# Patient Record
Sex: FEMALE | Race: WHITE | Employment: OTHER | URBAN - METROPOLITAN AREA
[De-identification: names, ages, dates, MRNs, and addresses within clinical notes are randomized per-mention and may not be internally consistent; named-entity substitution may affect disease eponyms.]

---

## 2020-12-09 ENCOUNTER — LAB ENCOUNTER (OUTPATIENT)
Dept: LAB | Age: 60
End: 2020-12-09
Attending: PHYSICIAN ASSISTANT
Payer: OTHER GOVERNMENT

## 2020-12-09 ENCOUNTER — HOSPITAL ENCOUNTER (OUTPATIENT)
Dept: GENERAL RADIOLOGY | Age: 60
Discharge: HOME | End: 2020-12-09
Attending: PHYSICIAN ASSISTANT
Payer: OTHER GOVERNMENT

## 2020-12-09 DIAGNOSIS — R50.9 FEVER, UNSPECIFIED FEVER CAUSE: ICD-10-CM

## 2020-12-09 DIAGNOSIS — R05.9 COUGH: ICD-10-CM

## 2020-12-09 PROCEDURE — 36415 COLL VENOUS BLD VENIPUNCTURE: CPT

## 2020-12-09 PROCEDURE — 85025 COMPLETE CBC W/AUTO DIFF WBC: CPT

## 2020-12-09 PROCEDURE — 71046 X-RAY EXAM CHEST 2 VIEWS: CPT | Performed by: PHYSICIAN ASSISTANT

## 2020-12-11 ENCOUNTER — HOSPITAL ENCOUNTER (INPATIENT)
Facility: HOSPITAL | Age: 60
LOS: 5 days | Discharge: HOME | DRG: 177 | End: 2020-12-17
Attending: EMERGENCY MEDICINE | Admitting: HOSPITALIST
Payer: OTHER GOVERNMENT

## 2020-12-11 DIAGNOSIS — R09.02 HYPOXIA: ICD-10-CM

## 2020-12-11 DIAGNOSIS — U07.1 PNEUMONIA DUE TO COVID-19 VIRUS: Primary | ICD-10-CM

## 2020-12-11 DIAGNOSIS — J12.82 PNEUMONIA DUE TO COVID-19 VIRUS: Primary | ICD-10-CM

## 2020-12-11 RX ORDER — DEXAMETHASONE SODIUM PHOSPHATE 4 MG/ML
6 VIAL (ML) INJECTION ONCE
Status: COMPLETED | OUTPATIENT
Start: 2020-12-11 | End: 2020-12-12

## 2020-12-12 ENCOUNTER — APPOINTMENT (OUTPATIENT)
Dept: ULTRASOUND IMAGING | Facility: HOSPITAL | Age: 60
DRG: 177 | End: 2020-12-12
Attending: STUDENT IN AN ORGANIZED HEALTH CARE EDUCATION/TRAINING PROGRAM
Payer: OTHER GOVERNMENT

## 2020-12-12 PROBLEM — R09.02 HYPOXIA: Status: ACTIVE | Noted: 2020-12-12

## 2020-12-12 PROCEDURE — XW033E5 INTRODUCTION OF REMDESIVIR ANTI-INFECTIVE INTO PERIPHERAL VEIN, PERCUTANEOUS APPROACH, NEW TECHNOLOGY GROUP 5: ICD-10-PCS | Performed by: HOSPITALIST

## 2020-12-12 PROCEDURE — 3E0333Z INTRODUCTION OF ANTI-INFLAMMATORY INTO PERIPHERAL VEIN, PERCUTANEOUS APPROACH: ICD-10-PCS | Performed by: HOSPITALIST

## 2020-12-12 PROCEDURE — 93971 EXTREMITY STUDY: CPT | Performed by: STUDENT IN AN ORGANIZED HEALTH CARE EDUCATION/TRAINING PROGRAM

## 2020-12-12 PROCEDURE — 99223 1ST HOSP IP/OBS HIGH 75: CPT | Performed by: HOSPITALIST

## 2020-12-12 RX ORDER — PREDNISONE 1 MG/1
5 TABLET ORAL DAILY
Status: ON HOLD | COMMUNITY
End: 2020-12-16

## 2020-12-12 RX ORDER — SODIUM CHLORIDE 9 MG/ML
INJECTION, SOLUTION INTRAVENOUS CONTINUOUS
Status: ACTIVE | OUTPATIENT
Start: 2020-12-12 | End: 2020-12-12

## 2020-12-12 RX ORDER — LORAZEPAM 0.5 MG/1
0.25 TABLET ORAL ONCE
Status: DISCONTINUED | OUTPATIENT
Start: 2020-12-12 | End: 2020-12-17

## 2020-12-12 RX ORDER — ENOXAPARIN SODIUM 100 MG/ML
40 INJECTION SUBCUTANEOUS DAILY
Status: DISCONTINUED | OUTPATIENT
Start: 2020-12-12 | End: 2020-12-13

## 2020-12-12 RX ORDER — FUROSEMIDE 10 MG/ML
40 INJECTION INTRAMUSCULAR; INTRAVENOUS ONCE
Status: COMPLETED | OUTPATIENT
Start: 2020-12-12 | End: 2020-12-12

## 2020-12-12 RX ORDER — LEVOTHYROXINE SODIUM 0.07 MG/1
75 TABLET ORAL
Status: DISCONTINUED | OUTPATIENT
Start: 2020-12-12 | End: 2020-12-17

## 2020-12-12 RX ORDER — ALBUTEROL SULFATE 90 UG/1
4 AEROSOL, METERED RESPIRATORY (INHALATION) EVERY 4 HOURS PRN
Status: DISCONTINUED | OUTPATIENT
Start: 2020-12-12 | End: 2020-12-17

## 2020-12-12 RX ORDER — DEXAMETHASONE SODIUM PHOSPHATE 4 MG/ML
6 VIAL (ML) INJECTION DAILY
Status: DISCONTINUED | OUTPATIENT
Start: 2020-12-12 | End: 2020-12-12

## 2020-12-12 RX ORDER — ALBUTEROL SULFATE 90 UG/1
4 AEROSOL, METERED RESPIRATORY (INHALATION) 4 TIMES DAILY
Status: DISCONTINUED | OUTPATIENT
Start: 2020-12-12 | End: 2020-12-12

## 2020-12-12 RX ORDER — DEXAMETHASONE SODIUM PHOSPHATE 4 MG/ML
6 VIAL (ML) INJECTION NIGHTLY
Status: DISCONTINUED | OUTPATIENT
Start: 2020-12-12 | End: 2020-12-17

## 2020-12-12 RX ORDER — ONDANSETRON 2 MG/ML
4 INJECTION INTRAMUSCULAR; INTRAVENOUS EVERY 6 HOURS PRN
Status: DISCONTINUED | OUTPATIENT
Start: 2020-12-12 | End: 2020-12-17

## 2020-12-12 RX ORDER — AZITHROMYCIN 250 MG/1
250 TABLET, FILM COATED ORAL DAILY
Status: ON HOLD | COMMUNITY
End: 2020-12-16

## 2020-12-12 RX ORDER — LEVOTHYROXINE SODIUM 0.07 MG/1
75 TABLET ORAL
COMMUNITY

## 2020-12-12 RX ORDER — ALBUTEROL SULFATE 90 UG/1
4 AEROSOL, METERED RESPIRATORY (INHALATION)
Status: DISCONTINUED | OUTPATIENT
Start: 2020-12-12 | End: 2020-12-12

## 2020-12-12 RX ORDER — ACETAMINOPHEN 325 MG/1
650 TABLET ORAL EVERY 6 HOURS PRN
Status: DISCONTINUED | OUTPATIENT
Start: 2020-12-12 | End: 2020-12-17

## 2020-12-12 NOTE — ED PROVIDER NOTES
Patient Seen in: [facility] Emergency Department      History   Patient presents with:  Difficulty Breathing  Covid    Stated Complaint: covid+ MARY JO    HPI    44-year-old female presents to the emergency room from the immediate care for evaluation of masses. Trachea midline. No cervical lymphadenopathy. HEART: Regular rate and rhythm, no murmurs. LUNGS: Coarse breath sounds bilaterally, no wheezing, no rales, no stridor  ABDOMEN: Soft, nondistended,non tender, no rebound, no rigidity, no guarding. n order CBC WITH DIFFERENTIAL WITH PLATELET.   Procedure                               Abnormality         Status                     ---------                               -----------         ------                     CBC W/ DIFFERENTIAL[[id number]] virus  (primary encounter diagnosis)  Hypoxia    Disposition:  Admit  12/12/2020 12:24 am    Follow-up:  No follow-up provider specified.         Medications Prescribed:  Current Discharge Medication List                          Present on Admission  Date

## 2020-12-12 NOTE — H&P
[facility]  History and Physical     [facility] Patient Status:  Emergency    1960 MRN [id number]   Location [address] [facility] Attending Asif Vidales, [address] [facility] Day # 0 PCP Harika Chen MD     Chief Complaint: dysp Anicteric. Neck: No lymphadenopathy. No JVD. No carotid bruits. Respiratory: Decreased air entry, no wheezes, mild rhonchi  Cardiovascular: S1, S2. Regular rate and rhythm. No murmurs, no rubs or gallops. Equal pulses.    Chest and Back: No tenderness or

## 2020-12-12 NOTE — COVID NURSING ASSESSMENT
COVID-19 Daily Discharge Readiness-Nursing    O2 Sat at Rest:    93 %  On 6L nasal cannula  O2 Sat with Exertion:   87 % on    6 liters  With ambulation  Temperature max from last 24 hrs: Temp (24hrs), Av.1 °F (37.3 °C), Min:98.4 °F (36.9 °C), Max:100

## 2020-12-12 NOTE — ED INITIAL ASSESSMENT (HPI)
62 yo F, covid+, symptoms since 12/1/20, sent from [facility] for hypoxia. 60% with ambulatory O2. 80% room air on arrival while at rest. Pt is awake, but slow to respond.

## 2020-12-13 ENCOUNTER — APPOINTMENT (OUTPATIENT)
Dept: CT IMAGING | Facility: HOSPITAL | Age: 60
DRG: 177 | End: 2020-12-13
Attending: HOSPITALIST
Payer: OTHER GOVERNMENT

## 2020-12-13 ENCOUNTER — APPOINTMENT (OUTPATIENT)
Dept: GENERAL RADIOLOGY | Facility: HOSPITAL | Age: 60
DRG: 177 | End: 2020-12-13
Attending: HOSPITALIST
Payer: OTHER GOVERNMENT

## 2020-12-13 PROCEDURE — XW043H5 INTRODUCTION OF TOCILIZUMAB INTO CENTRAL VEIN, PERCUTANEOUS APPROACH, NEW TECHNOLOGY GROUP 5: ICD-10-PCS | Performed by: HOSPITALIST

## 2020-12-13 PROCEDURE — 71275 CT ANGIOGRAPHY CHEST: CPT | Performed by: HOSPITALIST

## 2020-12-13 PROCEDURE — 99233 SBSQ HOSP IP/OBS HIGH 50: CPT | Performed by: STUDENT IN AN ORGANIZED HEALTH CARE EDUCATION/TRAINING PROGRAM

## 2020-12-13 PROCEDURE — 71045 X-RAY EXAM CHEST 1 VIEW: CPT | Performed by: HOSPITALIST

## 2020-12-13 RX ORDER — LIDOCAINE AND PRILOCAINE 25; 25 MG/G; MG/G
CREAM TOPICAL AS NEEDED
Status: DISCONTINUED | OUTPATIENT
Start: 2020-12-13 | End: 2020-12-17

## 2020-12-13 RX ORDER — GARLIC EXTRACT 500 MG
1 CAPSULE ORAL
Status: DISCONTINUED | OUTPATIENT
Start: 2020-12-13 | End: 2020-12-14

## 2020-12-13 RX ORDER — FUROSEMIDE 10 MG/ML
20 INJECTION INTRAMUSCULAR; INTRAVENOUS ONCE
Status: COMPLETED | OUTPATIENT
Start: 2020-12-13 | End: 2020-12-13

## 2020-12-13 RX ORDER — ENOXAPARIN SODIUM 100 MG/ML
0.6 INJECTION SUBCUTANEOUS EVERY 12 HOURS SCHEDULED
Status: DISCONTINUED | OUTPATIENT
Start: 2020-12-13 | End: 2020-12-16

## 2020-12-13 NOTE — COVID NURSING ASSESSMENT
COVID-19 Daily Discharge Readiness-Nursing    O2 Sat at Rest:   92  %  On 10 liters  O2 Sat with Exertion:  82  % on   15 liters   Temperature max from last 24 hrs: Temp (24hrs), Av.6 °F (36.4 °C), Min:94.6 °F (34.8 °C), Max:98.4 °F (36.9 °C)    Inflam

## 2020-12-13 NOTE — PLAN OF CARE
Problem: Patient/Family Goals  Goal: Patient/Family Long Term Goal  Description: Patient's Long Term Goal: To recover from covid.     Interventions:  - Decadron, remdesivir  -prone, wean O2  -IS, albuterol  - See additional Care Plan goals for specific in

## 2020-12-13 NOTE — RESPIRATORY THERAPY NOTE
Attempted to draw ABG on patient because of increased O2 demand overnight. Patient refusing, talked extensively with patient about the reasoning behind the blood draw.  Patient acknowledges the information and agrees if O2 demands continue to go up overnigh

## 2020-12-13 NOTE — PROGRESS NOTES
[facility]  Progress Note     Ranjan Ferrera Patient Status:  Inpatient    1960 MRN [id number]   [facility] 4NW-A Attending Inocente Homans, MD   Hosp Day # 1 PCP Regan Lombardi MD     Chief Complaint: COVID     S: Patient  With results for input(s): PTP, INR in the last 168 hours. Recent Labs   Lab 12/11/20  2258 12/12/20  0109 12/12/20  0833   TROP <0.045 <0.045 <0.045   *  --  430*            Imaging: Imaging data reviewed in Epic.     Medications:   • enoxaparin  0.6 m

## 2020-12-13 NOTE — COVID NURSING ASSESSMENT
COVID-19 Daily Discharge Readiness-Nursing    O2 Sat at Rest: 91-94    % on 10L highflow  O2 Sat with Exertion:   89% on  10  liters   Temperature max from last 24 hrs: Temp (24hrs), Av °F (36.7 °C), Min:97.3 °F (36.3 °C), Max:98.4 °F (36.9 °C)    Infl

## 2020-12-13 NOTE — PLAN OF CARE
Pt anxious and complaining unable to sleep, stating she just wants to go home. Order obtained for one time dose ativan. Pt refused medication.  Will continue to monitor

## 2020-12-13 NOTE — PROGRESS NOTES
Pt with rapid increase in O2 requirement. ABG, CXR, lasix, actemra ordered. Will get AM labs now    Vivian Lewis MD    Addendum:  D dimer elevated. Inc lovenox. Check CTA.     Vivian Lewis MD

## 2020-12-13 NOTE — COVID NURSING ASSESSMENT
COVID-19 Daily Discharge Readiness-Nursing    O2 Sat at Rest:   91  %  At 4L of O2  O2 Sat with Exertion:  84  % on    5 liters   Temperature max from last 24 hrs: Temp (24hrs), Av.7 °F (37.1 °C), Min:98 °F (36.7 °C), Max:100 °F (37.8 °C)    Inflammato

## 2020-12-14 PROCEDURE — 99233 SBSQ HOSP IP/OBS HIGH 50: CPT | Performed by: STUDENT IN AN ORGANIZED HEALTH CARE EDUCATION/TRAINING PROGRAM

## 2020-12-14 RX ORDER — GARLIC EXTRACT 500 MG
1 CAPSULE ORAL DAILY
Status: DISCONTINUED | OUTPATIENT
Start: 2020-12-15 | End: 2020-12-14

## 2020-12-14 RX ORDER — GARLIC EXTRACT 500 MG
1 CAPSULE ORAL DAILY
Status: DISCONTINUED | OUTPATIENT
Start: 2020-12-14 | End: 2020-12-17

## 2020-12-14 RX ORDER — DOCUSATE SODIUM 100 MG/1
100 CAPSULE, LIQUID FILLED ORAL 2 TIMES DAILY PRN
Status: DISCONTINUED | OUTPATIENT
Start: 2020-12-14 | End: 2020-12-17

## 2020-12-14 NOTE — BH PROGRESS NOTE
Called the pt to complete a level of care assessment for possible depression. She said, this liaison was the 4th call of the day and she does not want to go over the assessment today. She agreed to have this liaison call her tomorrow.

## 2020-12-14 NOTE — PROGRESS NOTES
[facility]  Progress Note     Ranjan Ferrera Patient Status:  Inpatient    1960 MRN [id number]   [facility] 4NW-A Attending Inocente Homans, MD   Hosp Day # 2 PCP Regan Lombardi MD     Chief Complaint: COVID     S: Patient on 8 TROP <0.045 <0.045 <0.045   *  --  430*            Imaging: Imaging data reviewed in Epic.     Medications:   • Acidophilus/Pectin  1 capsule Oral Daily   • enoxaparin  0.6 mg/kg Subcutaneous Q12H [address]   • dexamethasone Sodium Phosphate  6 mg Shahla Jerry

## 2020-12-14 NOTE — CM/SW NOTE
Pt admitted due to Laurie. SW spoke w/pt to assess for needs/explain SW role. Pt stated she did not feel well at this time. Asked if she would like SW to contact her at a later time. Pt stated she was ok w/talking to SW at this time.  Pt confirmed she did no

## 2020-12-14 NOTE — COVID NURSING ASSESSMENT
COVID-19 Daily Discharge Readiness-Nursing    O2 Sat at Rest:     94 % on 9L  O2 Sat with Exertion:   15  liters   Temperature max from last 24 hrs: Temp (24hrs), Av.1 °F (36.2 °C), Min:94.6 °F (34.8 °C), Max:98.2 °F (36.8 °C)    Inflammatory Markers:

## 2020-12-15 PROCEDURE — 99233 SBSQ HOSP IP/OBS HIGH 50: CPT | Performed by: STUDENT IN AN ORGANIZED HEALTH CARE EDUCATION/TRAINING PROGRAM

## 2020-12-15 RX ORDER — ECHINACEA PURPUREA EXTRACT 125 MG
2 TABLET ORAL AS NEEDED
Status: DISCONTINUED | OUTPATIENT
Start: 2020-12-15 | End: 2020-12-17

## 2020-12-15 NOTE — CONSULTS
[address] [facility]  TEL: [phone]  FAX: [phone]    Severa Floor Thurm Patient Status:  Inpatient    1960 MRN [id number]   [facility] 4NW-A Attending Gregory Gracia MD   [facility] Day # 3 PCP Evelyne Dye , Topical, PRN  •  Saline Nasal Spray (SALINE MIST) 2 spray, 2 spray, Each Nare, PRN  •  Acidophilus/Pectin (PROBIOTIC) CAPS 1 capsule, 1 capsule, Oral, Daily  •  docusate sodium (COLACE) cap 100 mg, 100 mg, Oral, BID PRN  •  lidocaine-prilocaine (EMLA) cr Reviewed in EMR    Imaging:    Imaging results reviewed     Impression:  Patient Active Problem List:     Displacement of intervertebral disc, site unspecified, without myelopathy     Displacement of lumbar intervertebral disc without myelopathy     Pn

## 2020-12-15 NOTE — CONSULTS
Kolby Quijano [address] [facility]  Pulmonary/Critical Care Consult Note  [facility]  Report of Consultation    Laura Cedillo Patient Status:  Inpatient    1960 MRN [id number]   [facility] 4NW-A Attending Jovita Dominguez docusate sodium, lidocaine-prilocaine, ondansetron HCl, sodium chloride 0.9%, acetaminophen, Albuterol Sulfate HFA    Review of Systems:   Constitutional: Negative for anorexia, chills, fatigue, fevers, malaise, night sweats and weight loss.   Eyes: Negativ ml   Output —   Net 850 ml            PHYSICAL EXAM  GEN: Appears alert, oriented, comfortable on 9L.  No acute distress  PSYCH: Normal mood and affect, AAOX3  NEURO: CN 2-12 grossly intact, no focal deficits  HEENT: Atraumatic, normocephalic, EOMI, no icte Markers  Recent Labs   Lab 12/11/20  2258 12/12/20  0833 12/13/20  0458 12/14/20  0726 12/15/20  1351   CRP 6.89* 7.13* 5.03* 2.15*  --    EMIL 1,151.2* 1,072.0* 1,251.0* 1,037.1* 855.5*   * 506* 487* 428* 422*   * 430*  --   --  233*   CHEYENNEIMER Finalized by (CST): Pamela Stein DO on 12/13/2020 at 8:27 AM       CT chest reviewed personally which shows bilaterally GGO.  No PE.    ASSESSMENT    · Acute hypoxic respiratory failure - due to COVID pneumonia  · COVID pneumonia   · Elevated transaminases

## 2020-12-15 NOTE — COVID NURSING ASSESSMENT
COVID-19 Daily Discharge Readiness-Nursing    O2 Sat at Rest:     %   O2 Sat with Exertion:    % on    liters   Temperature max from last 24 hrs: Temp (24hrs), Av.1 °F (36.2 °C), Min:95.8 °F (35.4 °C), Max:98 °F (36.7 °C)    Inflammatory Markers:   Rec

## 2020-12-15 NOTE — COVID NURSING ASSESSMENT
COVID-19 Daily Discharge Readiness-Nursing    O2 Sat at Rest:    94%  O2 Sat with Exertion:    80% on  9  liters   Temperature max from last 24 hrs: Temp (24hrs), Av.1 °F (36.2 °C), Min:96.5 °F (35.8 °C), Max:98 °F (36.7 °C)    Inflammatory Markers:

## 2020-12-15 NOTE — PROGRESS NOTES
[facility]  Progress Note     Santosh Paget Patient Status:  Inpatient    1960 MRN [id number]   [facility] 4NW-A Attending Anisha Collins MD   Hosp Day # 3 PCP Ayanna Tejeda MD     Chief Complaint: COVID     S: Patient w/ Chasity Guy reviewed in Epic.     Medications:   • Acidophilus/Pectin  1 capsule Oral Daily   • enoxaparin  0.6 mg/kg Subcutaneous Q12H [facility]   • dexamethasone Sodium Phosphate  6 mg Intravenous Nightly   • Levothyroxine Sodium  75 mcg Oral Before breakfast   • LORazepam

## 2020-12-16 PROCEDURE — 99232 SBSQ HOSP IP/OBS MODERATE 35: CPT | Performed by: STUDENT IN AN ORGANIZED HEALTH CARE EDUCATION/TRAINING PROGRAM

## 2020-12-16 RX ORDER — DEXAMETHASONE 4 MG/1
4 TABLET ORAL
Qty: 4 TABLET | Refills: 0 | Status: SHIPPED | OUTPATIENT
Start: 2020-12-17 | End: 2020-12-17

## 2020-12-16 NOTE — PLAN OF CARE
Patient was switched to regular nasal cannula at 6L,O2 sats at 91-92 at rest & when taking few steps to the bathroom. Will continue to monitor.

## 2020-12-16 NOTE — PLAN OF CARE
Shu humphries informed RN she had attempted to talk to patient for depression screening but patienet kept declining. Patient was asked by this RN if she could talk to the psychtristin liason & patient declined.  Patient was seen ambulating to the bathroom, [facility]

## 2020-12-16 NOTE — BH PROGRESS NOTE
Tried to talk with the pt yesterday and today and she didn't answer her phone. The pts nurse talked with her and she is refusing the assessment. The nurse said, there is no suicidal thoughts. Dr. Vargas Nguyen aware.

## 2020-12-16 NOTE — COVID NURSING ASSESSMENT
COVID-19 Daily Discharge Readiness-Nursing    O2 Sat at Rest:   93  %  On 5.5L O2  O2 Sat with Exertion:  82  % on  8  liters   Temperature max from last 24 hrs: Temp (24hrs), Av.6 °F (35.9 °C), Min:95.8 °F (35.4 °C), Max:97.5 °F (36.4 °C)    Inflammat

## 2020-12-16 NOTE — BH LEVEL OF CARE ASSESSMENT
Level of Care Assessment Note    General Questions  Why are you here?: Patient came into the hospital after being found on the floor of her bathroom by the care giver.   Precipitating Events: Pt has a history of anxiety and depression and consult was placed Linwood Rein  states uses edible cannabis for pain and anxiety  How long with this pattern of use?: 2 years  Last Use?: mos ago  Longest period of sobriety/abstinence?: unsure  Is your current use the most/worst it has ever been? : Yes Appropriate  Intensity of Volume: Ordinary  Clarity: Clear  Cognition  Concentration: Unimpaired  Memory: Recent memory intact; Remote memory intact  Orientation Level: Oriented X4  Insight: Good  Judgment: Good  Thought Patterns  Clarity/Relevance: Coheren

## 2020-12-16 NOTE — PROGRESS NOTES
Walk/Test/Clinical/Progress Note    Test Date:12/16/20    SPO2% on Room Air at rest: 85-56  SPO2% Ambulation on Room Air:85  SPO2% Ambulation on O2:  92% on 4 Liters    Patient was assessed, qualifies and requires oxygen

## 2020-12-16 NOTE — PROGRESS NOTES
[facility]  Progress Note     Muna Aleman Patient Status:  Inpatient    1960 MRN [id number]   [facility] 4NW-A Attending Maci Sanchez MD   Hosp Day # 4 PCP Yrn Mcnamara MD     Chief Complaint: COVID     S: Patient clini 12/15/20  1351   TROP <0.045 <0.045 <0.045  --    *  --  430* 233*            Imaging: Imaging data reviewed in Epic.     Medications:   • apixaban  2.5 mg Oral BID   • Acidophilus/Pectin  1 capsule Oral Daily   • dexamethasone Sodium Phosphate  6 mg

## 2020-12-17 VITALS
HEART RATE: 87 BPM | DIASTOLIC BLOOD PRESSURE: 61 MMHG | RESPIRATION RATE: 18 BRPM | BODY MASS INDEX: 36.15 KG/M2 | WEIGHT: 258.19 LBS | SYSTOLIC BLOOD PRESSURE: 116 MMHG | OXYGEN SATURATION: 93 % | TEMPERATURE: 97 F | HEIGHT: 71 IN

## 2020-12-17 PROCEDURE — 99239 HOSP IP/OBS DSCHRG MGMT >30: CPT | Performed by: HOSPITALIST

## 2020-12-17 RX ORDER — DEXAMETHASONE 6 MG/1
6 TABLET ORAL NIGHTLY
Qty: 4 TABLET | Refills: 0 | Status: SHIPPED | OUTPATIENT
Start: 2020-12-17 | End: 2020-12-17

## 2020-12-17 NOTE — PLAN OF CARE
Patient lying on her side resting. Now on 4L 02 per NC sats at 92-94%, 94% when up & ambulating to the bathroom w/ 02 at 4L per NC.

## 2020-12-17 NOTE — CM/SW NOTE
SW received an order for home O2. Sent required documents to [facility] to see if either agency can provide the O2. Asked them to check coverage/cost. Awaiting confirmation.

## 2020-12-17 NOTE — PROGRESS NOTES
[facility] Pulmonary/Critical Care Progress Note     SUBJECTIVE/Interval history: All events, procedures, notes reviewed. No acute events overnight, she feels well today, o2 weaned to 2L. She is hoping to go home.  Jeannie Holt CREATSERUM 0.77 0.90 1.15*   GFRAA 96 80 59*   GFRNAA 84 69 51*   CA 9.1 8.8 8.7    137 138   K 3.9 3.9 4.1    103 104   CO2 24.0 27.0 28.0     Recent Labs   Lab 12/14/20  0726 12/16/20  0551 12/17/20  0545   RBC 4.74 4.86 4.83   HGB 14.0 14. PE.     ASSESSMENT     · Acute hypoxic respiratory failure - due to COVID pneumonia  · COVID pneumonia   · Elevated transaminases  · Elevated inflammatory markers and d dimer  · hypothyroidism     PLAN     · Continue close monitoring of respiratory status,

## 2020-12-17 NOTE — CM/SW NOTE
[facility] approved O2. Pt has contact info. Gave pt Eliquis rebate cards. Pt ahs phone number for [employer]. Pt is very upset about how long everything took.  Apologized for this and informed her the order was written incorrectly by the MD.

## 2020-12-17 NOTE — PROGRESS NOTES
INFECTIOUS DISEASE PROGRESS  NOTE    Kaycee Cade Patient Status:  Inpatient    1960 MRN [id number]   [facility] 4NW-A Attending Mariaelena [facility] Day # 5 PCP Santos Davidson MD     Meds:  Sp remdesivir  dexa  tocshirley 12 Labs   Lab 12/11/20 2258   PCT 0.09       Cardiac  Recent Labs   Lab 12/11/20 2258 12/12/20  0109 12/12/20  0833   TROP <0.045 <0.045 <0.045   PBNP 75  --   --        Creatinine Kinase  Recent Labs   Lab 12/11/20 2258 12/12/20  0833 12/15/20  1351   CK anatomy. 3D volume renderings are generated. Dose reduction techniques were used. Dose information is transmitted to the ACR [facility] [employer] [facility]) NRDR ([address]) which includes the Dose Index Registry.   PATIENT STATED HI following veins were imaged: Common femoral, profunda femoral, superficial femoral and popliteal veins. If thrombus was encountered, then additional duplex color flow imaging and Doppler spectral analysis was also used.     PATIENT STATED HISTORY: (As lovell my standpoint if OK with other MDs    Ronald Miller  12/17/2020  11:18 AM

## 2020-12-17 NOTE — PROGRESS NOTES
[address] [place] [address]  TEL: [phone]  FAX: [phone]    Clarissa Cohendarianneil Mcduffieyahir Patient Status:  Inpatient    1960 MRN [id number]   [facility] 4NW-A Attending Mirna Chawla MD   [facility] Day # 4 PCP Chelsey Giron Oral, Before breakfast  •  Albuterol Sulfate  (90 Base) MCG/ACT inhaler 4 puff, 4 puff, Inhalation, Q4H PRN  •  LORazepam (ATIVAN) tab 0.25 mg, 0.25 mg, Oral, Once    Review of Systems:    A comprehensive 10 point review of systems was completed.   P tomorrow, can likely D/C

## 2020-12-17 NOTE — COVID NURSING ASSESSMENT
COVID-19 Daily Discharge Readiness-Nursing    O2 Sat at Rest:   92-94  %  On 3.5L   O2 Sat with Exertion: SPO2 Ambulation on Oxygen: 92(w/ high flow O2 at 4L)  % on    liters   Temperature max from last 24 hrs: Temp (24hrs), Av.5 °F (35.8 °C), Min:95.7

## 2020-12-17 NOTE — PROGRESS NOTES
O2 walk completed.     O2 sat at rest on room air: 94%  O2 sat while ambulating on room air: 87%  O2 sat while ambulating on 2L O2: 90%

## 2020-12-17 NOTE — PROGRESS NOTES
[facility] Note: Route Optimization for Dexamethasone (Decadron)    Patient is currently on Dexamethasone (Decadron) 6 mg IV every 24 hours.    The patient meets the criteria to convert to the oral equivalent as established by the IV to Oral conversion pr

## 2020-12-17 NOTE — CM/SW NOTE
[address] stating they can provide O2. They are checking O2 cost. Will follow up later today.

## 2020-12-17 NOTE — PROGRESS NOTES
[facility]  Progress Note     Sweetie Cook Patient Status:  Inpatient    1960 MRN [id number]   [facility] 4NW-A Attending [facility] PCP Ashley Crump MD     Chief Complaint: SOB    S: Patient <0.045 <0.045  --    *  --  430* 233*            Imaging: Imaging data reviewed in Epic.     Medications:   • dexamethasone  6 mg Oral Nightly   • apixaban  2.5 mg Oral BID   • Acidophilus/Pectin  1 capsule Oral Daily   • Levothyroxine Sodium  75 mcg

## 2020-12-18 NOTE — COVID NURSING ASSESSMENT
COVID-19 Daily Discharge Readiness-Nursing    O2 Sat at Rest:     %   O2 Sat with Exertion: SPO2 Ambulation on Oxygen: 92(w/ high flow O2 at 4L)  % on    liters   Temperature max from last 24 hrs: Temp (24hrs), Av.3 °F (36.3 °C), Min:95.8 °F (35.4 °C),

## 2021-01-04 NOTE — DISCHARGE SUMMARY
[facility]  DISCHARGE SUMMARY     Laura Cedillo Patient Status:  Inpatient    1960 MRN [id number]   [facility] 4NW-A Attending No att. providers found   [facility] Day # 5 PCP Roger Montague MD     Date of Admission: 2020  Da Discharge:   · None    Consultants:  • Infectious disease-Dr. Reina Conner  • Pulmonology-Dr. Katrin Nunez    Discharge Medication List:     Discharge Medications      START taking these medications      Instructions Prescription details   apixaban 2.5 MG Tabs  Common

## 2021-01-16 ENCOUNTER — HOSPITAL ENCOUNTER (OUTPATIENT)
Dept: GENERAL RADIOLOGY | Age: 61
Discharge: HOME | End: 2021-01-16
Attending: INTERNAL MEDICINE
Payer: COMMERCIAL

## 2021-01-16 DIAGNOSIS — U07.1 PNEUMONIA DUE TO COVID-19 VIRUS: ICD-10-CM

## 2021-01-16 DIAGNOSIS — J12.82 PNEUMONIA DUE TO COVID-19 VIRUS: ICD-10-CM

## 2021-01-16 DIAGNOSIS — R09.02 HYPOXIA: ICD-10-CM

## 2021-01-16 PROCEDURE — 71046 X-RAY EXAM CHEST 2 VIEWS: CPT | Performed by: INTERNAL MEDICINE

## 2021-04-28 ENCOUNTER — LAB ENCOUNTER (OUTPATIENT)
Dept: LAB | Age: 61
End: 2021-04-28
Attending: PHYSICIAN ASSISTANT
Payer: COMMERCIAL

## 2021-04-28 ENCOUNTER — HOSPITAL ENCOUNTER (OUTPATIENT)
Dept: GENERAL RADIOLOGY | Age: 61
Discharge: HOME | End: 2021-04-28
Attending: PHYSICIAN ASSISTANT
Payer: COMMERCIAL

## 2021-04-28 DIAGNOSIS — U07.1 PNEUMONIA DUE TO COVID-19 VIRUS: ICD-10-CM

## 2021-04-28 DIAGNOSIS — J12.82 PNEUMONIA DUE TO COVID-19 VIRUS: ICD-10-CM

## 2021-04-28 PROCEDURE — 36415 COLL VENOUS BLD VENIPUNCTURE: CPT

## 2021-04-28 PROCEDURE — 86769 SARS-COV-2 COVID-19 ANTIBODY: CPT

## 2021-04-28 PROCEDURE — 71046 X-RAY EXAM CHEST 2 VIEWS: CPT | Performed by: PHYSICIAN ASSISTANT

## 2023-07-21 ENCOUNTER — APPOINTMENT (OUTPATIENT)
Dept: GENERAL RADIOLOGY | Facility: HOSPITAL | Age: 63
End: 2023-07-21
Payer: COMMERCIAL

## 2023-07-21 ENCOUNTER — HOSPITAL ENCOUNTER (EMERGENCY)
Facility: HOSPITAL | Age: 63
Discharge: HOME | End: 2023-07-21
Attending: EMERGENCY MEDICINE
Payer: COMMERCIAL

## 2023-07-21 VITALS
BODY MASS INDEX: 37.22 KG/M2 | SYSTOLIC BLOOD PRESSURE: 153 MMHG | RESPIRATION RATE: 20 BRPM | OXYGEN SATURATION: 100 % | WEIGHT: 260 LBS | HEIGHT: 70 IN | TEMPERATURE: 99 F | DIASTOLIC BLOOD PRESSURE: 64 MMHG | HEART RATE: 74 BPM

## 2023-07-21 DIAGNOSIS — F41.1 ANXIETY REACTION: ICD-10-CM

## 2023-07-21 DIAGNOSIS — R07.89 CHEST PAIN, ATYPICAL: Primary | ICD-10-CM

## 2023-07-21 LAB
ALBUMIN SERPL-MCNC: 3.4 G/DL (ref 3.4–5)
ALBUMIN/GLOB SERPL: 1 {RATIO} (ref 1–2)
ALP LIVER SERPL-CCNC: 55 U/L
ALT SERPL-CCNC: 28 U/L
ANION GAP SERPL CALC-SCNC: 3 MMOL/L (ref 0–18)
AST SERPL-CCNC: 21 U/L (ref 15–37)
ATRIAL RATE: 76 BPM
BASOPHILS # BLD AUTO: 0.04 X10(3) UL (ref 0–0.2)
BASOPHILS NFR BLD AUTO: 0.6 %
BILIRUB SERPL-MCNC: 0.4 MG/DL (ref 0.1–2)
BUN BLD-MCNC: 16 MG/DL (ref 7–18)
CALCIUM BLD-MCNC: 8.8 MG/DL (ref 8.5–10.1)
CHLORIDE SERPL-SCNC: 107 MMOL/L (ref 98–112)
CO2 SERPL-SCNC: 29 MMOL/L (ref 21–32)
CREAT BLD-MCNC: 0.97 MG/DL
EGFRCR SERPLBLD CKD-EPI 2021: 66 ML/MIN/1.73M2 (ref 60–?)
EOSINOPHIL # BLD AUTO: 0.13 X10(3) UL (ref 0–0.7)
EOSINOPHIL NFR BLD AUTO: 2.1 %
ERYTHROCYTE [DISTWIDTH] IN BLOOD BY AUTOMATED COUNT: 12.7 %
GLOBULIN PLAS-MCNC: 3.5 G/DL (ref 2.8–4.4)
GLUCOSE BLD-MCNC: 257 MG/DL (ref 70–99)
HCT VFR BLD AUTO: 44 %
HGB BLD-MCNC: 14.4 G/DL
IMM GRANULOCYTES # BLD AUTO: 0.01 X10(3) UL (ref 0–1)
IMM GRANULOCYTES NFR BLD: 0.2 %
LYMPHOCYTES # BLD AUTO: 1.98 X10(3) UL (ref 1–4)
LYMPHOCYTES NFR BLD AUTO: 31.6 %
MCH RBC QN AUTO: 29.9 PG (ref 26–34)
MCHC RBC AUTO-ENTMCNC: 32.7 G/DL (ref 31–37)
MCV RBC AUTO: 91.5 FL
MONOCYTES # BLD AUTO: 0.46 X10(3) UL (ref 0.1–1)
MONOCYTES NFR BLD AUTO: 7.3 %
NEUTROPHILS # BLD AUTO: 3.64 X10 (3) UL (ref 1.5–7.7)
NEUTROPHILS # BLD AUTO: 3.64 X10(3) UL (ref 1.5–7.7)
NEUTROPHILS NFR BLD AUTO: 58.2 %
OSMOLALITY SERPL CALC.SUM OF ELEC: 298 MOSM/KG (ref 275–295)
P AXIS: 67 DEGREES
P-R INTERVAL: 158 MS
PLATELET # BLD AUTO: 233 10(3)UL (ref 150–450)
POTASSIUM SERPL-SCNC: 4.1 MMOL/L (ref 3.5–5.1)
PROT SERPL-MCNC: 6.9 G/DL (ref 6.4–8.2)
Q-T INTERVAL: 350 MS
QRS DURATION: 94 MS
QTC CALCULATION (BEZET): 393 MS
R AXIS: 3 DEGREES
RBC # BLD AUTO: 4.81 X10(6)UL
SODIUM SERPL-SCNC: 139 MMOL/L (ref 136–145)
T AXIS: -20 DEGREES
TROPONIN I HIGH SENSITIVITY: 6 NG/L
VENTRICULAR RATE: 76 BPM
WBC # BLD AUTO: 6.3 X10(3) UL (ref 4–11)

## 2023-07-21 PROCEDURE — 85025 COMPLETE CBC W/AUTO DIFF WBC: CPT

## 2023-07-21 PROCEDURE — 99285 EMERGENCY DEPT VISIT HI MDM: CPT

## 2023-07-21 PROCEDURE — 80053 COMPREHEN METABOLIC PANEL: CPT | Performed by: EMERGENCY MEDICINE

## 2023-07-21 PROCEDURE — 99284 EMERGENCY DEPT VISIT MOD MDM: CPT

## 2023-07-21 PROCEDURE — 80053 COMPREHEN METABOLIC PANEL: CPT

## 2023-07-21 PROCEDURE — 84484 ASSAY OF TROPONIN QUANT: CPT | Performed by: EMERGENCY MEDICINE

## 2023-07-21 PROCEDURE — 84484 ASSAY OF TROPONIN QUANT: CPT

## 2023-07-21 PROCEDURE — 36415 COLL VENOUS BLD VENIPUNCTURE: CPT

## 2023-07-21 PROCEDURE — 71045 X-RAY EXAM CHEST 1 VIEW: CPT

## 2023-07-21 PROCEDURE — 93005 ELECTROCARDIOGRAM TRACING: CPT

## 2023-07-21 PROCEDURE — 85025 COMPLETE CBC W/AUTO DIFF WBC: CPT | Performed by: EMERGENCY MEDICINE

## 2023-07-21 PROCEDURE — 93010 ELECTROCARDIOGRAM REPORT: CPT

## 2023-07-21 NOTE — ED QUICK NOTES
Patient states she had chest pain, described as a burning, in her chest radiating into left arm and bilateral ribs. Was burping a lot. States she has seen her results on Mychart, \"everything is normal, I'd like to go home. \"

## 2023-07-22 NOTE — DISCHARGE INSTRUCTIONS
Please follow-up with your primary care physician for further evaluation and testing. Try to do daily deep breathing for 10 to 30 minutes. Make sure to do this when you are not having a panic attack and try to create a daily routine.   Return for any problems

## 2024-03-20 ENCOUNTER — HOSPITAL ENCOUNTER (OUTPATIENT)
Dept: LAB | Facility: HOSPITAL | Age: 64
Discharge: HOME | End: 2024-03-20
Attending: PHYSICIAN ASSISTANT
Payer: COMMERCIAL

## 2024-03-20 DIAGNOSIS — E03.9 ACQUIRED HYPOTHYROIDISM: ICD-10-CM

## 2024-03-20 LAB
T4 FREE SERPL-MCNC: 0.8 NG/DL (ref 0.8–1.7)
TSI SER-ACNC: 4.58 MIU/ML (ref 0.36–3.74)

## 2024-03-20 PROCEDURE — 84439 ASSAY OF FREE THYROXINE: CPT | Performed by: PHYSICIAN ASSISTANT

## 2024-03-20 PROCEDURE — 36415 COLL VENOUS BLD VENIPUNCTURE: CPT | Performed by: PHYSICIAN ASSISTANT

## 2024-03-20 PROCEDURE — 84443 ASSAY THYROID STIM HORMONE: CPT | Performed by: PHYSICIAN ASSISTANT

## 2025-03-07 NOTE — PROGRESS NOTES
COVID-19 Daily Discharge Readiness-Nursing    O2 Sat at Rest:     %   O2 Sat with Exertion: SPO2 Ambulation on Oxygen: 92(w/ high flow O2 at 4L)  % on    liters   Temperature max from last 24 hrs: Temp (24hrs), Av.6 °F (35.9 °C), Min:95.7 °F (35.4 °C), negative